# Patient Record
Sex: MALE | Race: WHITE | URBAN - METROPOLITAN AREA
[De-identification: names, ages, dates, MRNs, and addresses within clinical notes are randomized per-mention and may not be internally consistent; named-entity substitution may affect disease eponyms.]

---

## 2018-05-31 ENCOUNTER — COMMUNICATION - HEALTHEAST (OUTPATIENT)
Dept: FAMILY MEDICINE | Facility: CLINIC | Age: 38
End: 2018-05-31

## 2019-05-14 ENCOUNTER — RECORDS - HEALTHEAST (OUTPATIENT)
Dept: LAB | Facility: CLINIC | Age: 39
End: 2019-05-14

## 2019-05-14 LAB
ALBUMIN SERPL-MCNC: 4.4 G/DL (ref 3.5–5)
ALP SERPL-CCNC: 67 U/L (ref 45–120)
ALT SERPL W P-5'-P-CCNC: 20 U/L (ref 0–45)
ANION GAP SERPL CALCULATED.3IONS-SCNC: 13 MMOL/L (ref 5–18)
AST SERPL W P-5'-P-CCNC: 27 U/L (ref 0–40)
BILIRUB SERPL-MCNC: 0.6 MG/DL (ref 0–1)
BUN SERPL-MCNC: 14 MG/DL (ref 8–22)
CALCIUM SERPL-MCNC: 9.8 MG/DL (ref 8.5–10.5)
CHLORIDE BLD-SCNC: 100 MMOL/L (ref 98–107)
CHOLEST SERPL-MCNC: 135 MG/DL
CO2 SERPL-SCNC: 24 MMOL/L (ref 22–31)
CREAT SERPL-MCNC: 0.94 MG/DL (ref 0.7–1.3)
FASTING STATUS PATIENT QL REPORTED: YES
GFR SERPL CREATININE-BSD FRML MDRD: >60 ML/MIN/1.73M2
GLUCOSE BLD-MCNC: 99 MG/DL (ref 70–125)
HDLC SERPL-MCNC: 47 MG/DL
LDLC SERPL CALC-MCNC: 81 MG/DL
POTASSIUM BLD-SCNC: 4 MMOL/L (ref 3.5–5)
PROT SERPL-MCNC: 7 G/DL (ref 6–8)
SODIUM SERPL-SCNC: 137 MMOL/L (ref 136–145)
TRIGL SERPL-MCNC: 35 MG/DL

## 2019-07-19 ENCOUNTER — RECORDS - HEALTHEAST (OUTPATIENT)
Dept: LAB | Facility: CLINIC | Age: 39
End: 2019-07-19

## 2019-07-19 ENCOUNTER — TRANSFERRED RECORDS (OUTPATIENT)
Dept: HEALTH INFORMATION MANAGEMENT | Facility: CLINIC | Age: 39
End: 2019-07-19

## 2019-07-19 LAB
ERYTHROCYTE [SEDIMENTATION RATE] IN BLOOD BY WESTERGREN METHOD: 2 MM/HR (ref 0–15)
TSH SERPL DL<=0.005 MIU/L-ACNC: 2.08 UIU/ML (ref 0.3–5)

## 2019-07-26 ENCOUNTER — ANCILLARY PROCEDURE (OUTPATIENT)
Dept: MRI IMAGING | Facility: CLINIC | Age: 39
End: 2019-07-26
Attending: PHYSICIAN ASSISTANT
Payer: COMMERCIAL

## 2019-07-26 DIAGNOSIS — G44.019 EPISODIC CLUSTER HEADACHE, NOT INTRACTABLE: ICD-10-CM

## 2019-07-26 PROCEDURE — 70544 MR ANGIOGRAPHY HEAD W/O DYE: CPT | Mod: TC

## 2019-07-26 PROCEDURE — 70551 MRI BRAIN STEM W/O DYE: CPT | Mod: TC

## 2019-12-18 ENCOUNTER — RECORDS - HEALTHEAST (OUTPATIENT)
Dept: LAB | Facility: CLINIC | Age: 39
End: 2019-12-18

## 2019-12-18 LAB
ANION GAP SERPL CALCULATED.3IONS-SCNC: 6 MMOL/L (ref 5–18)
BUN SERPL-MCNC: 16 MG/DL (ref 8–22)
CALCIUM SERPL-MCNC: 9.7 MG/DL (ref 8.5–10.5)
CHLORIDE BLD-SCNC: 99 MMOL/L (ref 98–107)
CHOLEST SERPL-MCNC: 144 MG/DL
CO2 SERPL-SCNC: 30 MMOL/L (ref 22–31)
CREAT SERPL-MCNC: 0.94 MG/DL (ref 0.7–1.3)
FASTING STATUS PATIENT QL REPORTED: NORMAL
GFR SERPL CREATININE-BSD FRML MDRD: >60 ML/MIN/1.73M2
GLUCOSE BLD-MCNC: 93 MG/DL (ref 70–125)
HDLC SERPL-MCNC: 53 MG/DL
LDLC SERPL CALC-MCNC: 82 MG/DL
POTASSIUM BLD-SCNC: 4.1 MMOL/L (ref 3.5–5)
SODIUM SERPL-SCNC: 135 MMOL/L (ref 136–145)
TRIGL SERPL-MCNC: 43 MG/DL
TSH SERPL DL<=0.005 MIU/L-ACNC: 2.47 UIU/ML (ref 0.3–5)

## 2020-02-24 ENCOUNTER — HEALTH MAINTENANCE LETTER (OUTPATIENT)
Age: 40
End: 2020-02-24

## 2020-12-13 ENCOUNTER — HEALTH MAINTENANCE LETTER (OUTPATIENT)
Age: 40
End: 2020-12-13

## 2021-04-17 ENCOUNTER — HEALTH MAINTENANCE LETTER (OUTPATIENT)
Age: 41
End: 2021-04-17

## 2021-09-26 ENCOUNTER — HEALTH MAINTENANCE LETTER (OUTPATIENT)
Age: 41
End: 2021-09-26

## 2022-05-08 ENCOUNTER — HEALTH MAINTENANCE LETTER (OUTPATIENT)
Age: 42
End: 2022-05-08

## 2023-01-14 ENCOUNTER — HEALTH MAINTENANCE LETTER (OUTPATIENT)
Age: 43
End: 2023-01-14

## 2023-06-02 ENCOUNTER — HEALTH MAINTENANCE LETTER (OUTPATIENT)
Age: 43
End: 2023-06-02

## 2023-07-18 ENCOUNTER — OFFICE VISIT (OUTPATIENT)
Dept: NEUROLOGY | Facility: CLINIC | Age: 43
End: 2023-07-18
Payer: COMMERCIAL

## 2023-07-18 DIAGNOSIS — M62.82 NON-TRAUMATIC RHABDOMYOLYSIS: Primary | ICD-10-CM

## 2023-07-18 PROCEDURE — 88305 TISSUE EXAM BY PATHOLOGIST: CPT | Mod: 26 | Performed by: PSYCHIATRY & NEUROLOGY

## 2023-07-18 PROCEDURE — 88319 ENZYME HISTOCHEMISTRY: CPT | Mod: 26 | Performed by: PSYCHIATRY & NEUROLOGY

## 2023-07-18 PROCEDURE — 88314 HISTOCHEMICAL STAINS ADD-ON: CPT | Mod: 26 | Performed by: PSYCHIATRY & NEUROLOGY

## 2023-07-18 NOTE — PROGRESS NOTES
[facility] PHYSICIANS  NEUROMUSCULAR PATHOLOGY REPORT   NEUROMUSCULAR LABORATORY   [phone] / [phone]  [address],  [place] [address]     MUSCLE BIOPSY LIGHT MICROSCOPY REPORT    DATE OF BIOPSY: 07/14/2023   DATE OF REPORT: 07/18/2023  SPECIMEN NO:     SURGEON: Dr. Currie  REFERRING PHYSICIAN: Dr. Currie    CLINICAL INFORMATION:  This 43-year-old man with history of rhabdomyolysis and exercise intolerance had a muscle biopsy performed to investigate the possibility of having a myopathy.    LEFT QUAD MUSCLE BIOPSY:  Two pieces of muscle were quick frozen for light microscopy and histochemistry. Another piece of muscle was stretched and fixed in formalin for paraffin-embedding. Muscle was also fixed in 4:1 EM fixative for plastic-embedding. MAB stained sections were reviewed and an appropriate area selected for ultrastructural study. Additional pieces were quick frozen for biochemical testing.    LIGHT MICROSCOPY:  Frozen sections stained with H&E, PAS, oil red O, [place] red and trichrome and plastic-embedded sections stained with MAB were available for review. There was normal fiber size variation with diameters ranging from 45-55  . There were no degenerating or necrotic fibers. Muscle fiber nuclei were appropriately peripheral in location. There was no inflammation. There were no ragged-red fibers identified on the trichrome stain. Glycogen and lipid content were normal. There was no abnormal congophilic staining.    HISTOCHEMISTRY:  Frozen sections stained with ATPase (pH 4.35, 4.5 and 9.4), NADH, SDH, MUSTAFA, acid phosphatase, alkaline phosphatase, phosphorylase, myoadenylate deaminase (MAD) and nonspecific esterase were available for review. ATPase staining identified fibers of types 1, 2a, 2b. There was a normal fiber type distribution. There was no fiber type grouping. There was no consistent difference in fiber size between fibers of different types. Oxidative  enzyme stain deposition was normal. There were no ragged blue fibers. There were no MUSTAFA deficient fibers. Esterase and acid phosphatase staining were normal. Phosphorylase and MAD staining were normal.    ELECTRONMICROSCOPIC FEATURES:  Most fibers had normal appearance with normal sarcolemmal and sarcomere organization. Scattered fibers had increased subsarcolemmal mitochondrial content, with most mitochondria being normal in size and configuration. Some fibers had increased glycogen and lipid content.    DIAGNOSIS:  Normal skeletal muscle    COMMENT:  There was no evidence on this biopsy of any myopathic process. Tissue remains for biochemical or genetic testing if clinically indicated.      Keyur Brewer MD

## 2023-07-18 NOTE — LETTER
7/18/2023       RE: Emil Ruiz  [address] [facility] [address]     Dear Colleague,    Thank you for referring your patient, Emil Ruiz, to the [facility]. Please see a copy of my visit note below.      [facility] PHYSICIANS  NEUROMUSCULAR PATHOLOGY REPORT   NEUROMUSCULAR LABORATORY   [phone] / [phone]  [address],  [place] [address]     MUSCLE BIOPSY LIGHT MICROSCOPY REPORT    DATE OF BIOPSY: 07/14/2023   DATE OF REPORT: 07/18/2023  SPECIMEN NO:     SURGEON: Dr. Currie  REFERRING PHYSICIAN: Dr. Currie    CLINICAL INFORMATION:  This 43-year-old man with history of rhabdomyolysis and exercise intolerance had a muscle biopsy performed to investigate the possibility of having a myopathy.    LEFT QUAD MUSCLE BIOPSY:  Two pieces of muscle were quick frozen for light microscopy and histochemistry. Another piece of muscle was stretched and fixed in formalin for paraffin-embedding. Muscle was also fixed in 4:1 EM fixative for plastic-embedding. MAB stained sections were reviewed and an appropriate area selected for ultrastructural study. Additional pieces were quick frozen for biochemical testing.    LIGHT MICROSCOPY:  Frozen sections stained with H&E, PAS, oil red O, [place] red and trichrome and plastic-embedded sections stained with MAB were available for review. There was normal fiber size variation with diameters ranging from 45-55  . There were no degenerating or necrotic fibers. Muscle fiber nuclei were appropriately peripheral in location. There was no inflammation. There were no ragged-red fibers identified on the trichrome stain. Glycogen and lipid content were normal. There was no abnormal congophilic staining.    HISTOCHEMISTRY:  Frozen sections stained with ATPase (pH 4.35, 4.5 and 9.4), NADH, SDH, MUSTAFA, acid phosphatase, alkaline phosphatase,  phosphorylase, myoadenylate deaminase (MAD) and nonspecific esterase were available for review. ATPase staining identified fibers of types 1, 2a, 2b. There was a normal fiber type distribution. There was no fiber type grouping. There was no consistent difference in fiber size between fibers of different types. Oxidative enzyme stain deposition was normal. There were no ragged blue fibers. There were no MUSTAFA deficient fibers. Esterase and acid phosphatase staining were normal. Phosphorylase and MAD staining were normal.    ELECTRONMICROSCOPIC FEATURES:  Most fibers had normal appearance with normal sarcolemmal and sarcomere organization. Scattered fibers had increased subsarcolemmal mitochondrial content, with most mitochondria being normal in size and configuration. Some fibers had increased glycogen and lipid content.    DIAGNOSIS:  Normal skeletal muscle    COMMENT:  There was no evidence on this biopsy of any myopathic process. Tissue remains for biochemical or genetic testing if clinically indicated.          Again, thank you for allowing me to participate in the care of your patient.      Sincerely,    Keyur Brewer MD